# Patient Record
Sex: FEMALE | HISPANIC OR LATINO | URBAN - METROPOLITAN AREA
[De-identification: names, ages, dates, MRNs, and addresses within clinical notes are randomized per-mention and may not be internally consistent; named-entity substitution may affect disease eponyms.]

---

## 2017-12-26 ENCOUNTER — INPATIENT (INPATIENT)
Facility: HOSPITAL | Age: 52
LOS: 2 days | Discharge: HOME | DRG: 418 | End: 2017-12-29
Attending: SURGERY | Admitting: SURGERY
Payer: COMMERCIAL

## 2017-12-26 VITALS — WEIGHT: 156.97 LBS

## 2017-12-26 DIAGNOSIS — Z29.9 ENCOUNTER FOR PROPHYLACTIC MEASURES, UNSPECIFIED: ICD-10-CM

## 2017-12-26 DIAGNOSIS — Z01.818 ENCOUNTER FOR OTHER PREPROCEDURAL EXAMINATION: ICD-10-CM

## 2017-12-26 DIAGNOSIS — K81.0 ACUTE CHOLECYSTITIS: ICD-10-CM

## 2017-12-26 LAB
ALBUMIN SERPL ELPH-MCNC: 2.6 G/DL — LOW (ref 3.5–5)
ALP SERPL-CCNC: 171 U/L — HIGH (ref 40–120)
ALT FLD-CCNC: 23 U/L DA — SIGNIFICANT CHANGE UP (ref 10–60)
ANION GAP SERPL CALC-SCNC: 10 MMOL/L — SIGNIFICANT CHANGE UP (ref 5–17)
AST SERPL-CCNC: 24 U/L — SIGNIFICANT CHANGE UP (ref 10–40)
BASOPHILS # BLD AUTO: 0 K/UL — SIGNIFICANT CHANGE UP (ref 0–0.2)
BASOPHILS NFR BLD AUTO: 0.3 % — SIGNIFICANT CHANGE UP (ref 0–2)
BILIRUB SERPL-MCNC: 0.6 MG/DL — SIGNIFICANT CHANGE UP (ref 0.2–1.2)
BUN SERPL-MCNC: 20 MG/DL — HIGH (ref 7–18)
CALCIUM SERPL-MCNC: 8.9 MG/DL — SIGNIFICANT CHANGE UP (ref 8.4–10.5)
CHLORIDE SERPL-SCNC: 101 MMOL/L — SIGNIFICANT CHANGE UP (ref 96–108)
CO2 SERPL-SCNC: 25 MMOL/L — SIGNIFICANT CHANGE UP (ref 22–31)
CREAT SERPL-MCNC: 0.91 MG/DL — SIGNIFICANT CHANGE UP (ref 0.5–1.3)
EOSINOPHIL # BLD AUTO: 0 K/UL — SIGNIFICANT CHANGE UP (ref 0–0.5)
EOSINOPHIL NFR BLD AUTO: 0 % — SIGNIFICANT CHANGE UP (ref 0–6)
GLUCOSE SERPL-MCNC: 214 MG/DL — HIGH (ref 70–99)
HCT VFR BLD CALC: 36 % — SIGNIFICANT CHANGE UP (ref 34.5–45)
HGB BLD-MCNC: 11 G/DL — LOW (ref 11.5–15.5)
LACTATE SERPL-SCNC: 1.2 MMOL/L — SIGNIFICANT CHANGE UP (ref 0.7–2)
LACTATE SERPL-SCNC: 2.7 MMOL/L — HIGH (ref 0.7–2)
LYMPHOCYTES # BLD AUTO: 1.3 K/UL — SIGNIFICANT CHANGE UP (ref 1–3.3)
LYMPHOCYTES # BLD AUTO: 9.5 % — LOW (ref 13–44)
MCHC RBC-ENTMCNC: 24.1 PG — LOW (ref 27–34)
MCHC RBC-ENTMCNC: 30.5 GM/DL — LOW (ref 32–36)
MCV RBC AUTO: 79.1 FL — LOW (ref 80–100)
MONOCYTES # BLD AUTO: 0.7 K/UL — SIGNIFICANT CHANGE UP (ref 0–0.9)
MONOCYTES NFR BLD AUTO: 4.8 % — SIGNIFICANT CHANGE UP (ref 2–14)
NEUTROPHILS # BLD AUTO: 11.7 K/UL — HIGH (ref 1.8–7.4)
NEUTROPHILS NFR BLD AUTO: 85.4 % — HIGH (ref 43–77)
PLATELET # BLD AUTO: 248 K/UL — SIGNIFICANT CHANGE UP (ref 150–400)
POTASSIUM SERPL-MCNC: 3.1 MMOL/L — LOW (ref 3.5–5.3)
POTASSIUM SERPL-SCNC: 3.1 MMOL/L — LOW (ref 3.5–5.3)
PROT SERPL-MCNC: 7.9 G/DL — SIGNIFICANT CHANGE UP (ref 6–8.3)
RBC # BLD: 4.56 M/UL — SIGNIFICANT CHANGE UP (ref 3.8–5.2)
RBC # FLD: 16.7 % — HIGH (ref 10.3–14.5)
SODIUM SERPL-SCNC: 136 MMOL/L — SIGNIFICANT CHANGE UP (ref 135–145)
WBC # BLD: 13.7 K/UL — HIGH (ref 3.8–10.5)
WBC # FLD AUTO: 13.7 K/UL — HIGH (ref 3.8–10.5)

## 2017-12-26 PROCEDURE — 71010: CPT | Mod: 26

## 2017-12-26 PROCEDURE — 76705 ECHO EXAM OF ABDOMEN: CPT | Mod: 26

## 2017-12-26 PROCEDURE — 99222 1ST HOSP IP/OBS MODERATE 55: CPT | Mod: 57

## 2017-12-26 PROCEDURE — 99285 EMERGENCY DEPT VISIT HI MDM: CPT

## 2017-12-26 RX ORDER — CEFOTETAN DISODIUM 1 G
VIAL (EA) INJECTION
Qty: 0 | Refills: 0 | Status: DISCONTINUED | OUTPATIENT
Start: 2017-12-26 | End: 2017-12-27

## 2017-12-26 RX ORDER — HEPARIN SODIUM 5000 [USP'U]/ML
5000 INJECTION INTRAVENOUS; SUBCUTANEOUS EVERY 8 HOURS
Qty: 0 | Refills: 0 | Status: DISCONTINUED | OUTPATIENT
Start: 2017-12-26 | End: 2017-12-27

## 2017-12-26 RX ORDER — DEXTROSE MONOHYDRATE, SODIUM CHLORIDE, AND POTASSIUM CHLORIDE 50; .745; 4.5 G/1000ML; G/1000ML; G/1000ML
1000 INJECTION, SOLUTION INTRAVENOUS
Qty: 0 | Refills: 0 | Status: DISCONTINUED | OUTPATIENT
Start: 2017-12-26 | End: 2017-12-27

## 2017-12-26 RX ORDER — CEFOTETAN DISODIUM 1 G
1 VIAL (EA) INJECTION EVERY 12 HOURS
Qty: 0 | Refills: 0 | Status: DISCONTINUED | OUTPATIENT
Start: 2017-12-27 | End: 2017-12-27

## 2017-12-26 RX ORDER — SODIUM CHLORIDE 9 MG/ML
500 INJECTION INTRAMUSCULAR; INTRAVENOUS; SUBCUTANEOUS
Qty: 0 | Refills: 0 | Status: COMPLETED | OUTPATIENT
Start: 2017-12-26 | End: 2017-12-26

## 2017-12-26 RX ORDER — POTASSIUM CHLORIDE 20 MEQ
20 PACKET (EA) ORAL ONCE
Qty: 0 | Refills: 0 | Status: COMPLETED | OUTPATIENT
Start: 2017-12-26 | End: 2017-12-27

## 2017-12-26 RX ORDER — AZITHROMYCIN 500 MG/1
500 TABLET, FILM COATED ORAL ONCE
Qty: 0 | Refills: 0 | Status: COMPLETED | OUTPATIENT
Start: 2017-12-26 | End: 2017-12-26

## 2017-12-26 RX ORDER — CEFAZOLIN SODIUM 1 G
1000 VIAL (EA) INJECTION EVERY 8 HOURS
Qty: 0 | Refills: 0 | Status: DISCONTINUED | OUTPATIENT
Start: 2017-12-26 | End: 2017-12-26

## 2017-12-26 RX ORDER — SODIUM CHLORIDE 9 MG/ML
3 INJECTION INTRAMUSCULAR; INTRAVENOUS; SUBCUTANEOUS ONCE
Qty: 0 | Refills: 0 | Status: COMPLETED | OUTPATIENT
Start: 2017-12-26 | End: 2017-12-26

## 2017-12-26 RX ORDER — CEFTRIAXONE 500 MG/1
1 INJECTION, POWDER, FOR SOLUTION INTRAMUSCULAR; INTRAVENOUS ONCE
Qty: 0 | Refills: 0 | Status: COMPLETED | OUTPATIENT
Start: 2017-12-26 | End: 2017-12-26

## 2017-12-26 RX ORDER — CEFOTETAN DISODIUM 1 G
1 VIAL (EA) INJECTION ONCE
Qty: 0 | Refills: 0 | Status: COMPLETED | OUTPATIENT
Start: 2017-12-26 | End: 2017-12-26

## 2017-12-26 RX ADMIN — SODIUM CHLORIDE 2000 MILLILITER(S): 9 INJECTION INTRAMUSCULAR; INTRAVENOUS; SUBCUTANEOUS at 16:10

## 2017-12-26 RX ADMIN — AZITHROMYCIN 500 MILLIGRAM(S): 500 TABLET, FILM COATED ORAL at 16:35

## 2017-12-26 RX ADMIN — SODIUM CHLORIDE 2000 MILLILITER(S): 9 INJECTION INTRAMUSCULAR; INTRAVENOUS; SUBCUTANEOUS at 16:30

## 2017-12-26 RX ADMIN — SODIUM CHLORIDE 2000 MILLILITER(S): 9 INJECTION INTRAMUSCULAR; INTRAVENOUS; SUBCUTANEOUS at 16:45

## 2017-12-26 RX ADMIN — CEFTRIAXONE 100 GRAM(S): 500 INJECTION, POWDER, FOR SOLUTION INTRAMUSCULAR; INTRAVENOUS at 16:35

## 2017-12-26 RX ADMIN — SODIUM CHLORIDE 3 MILLILITER(S): 9 INJECTION INTRAMUSCULAR; INTRAVENOUS; SUBCUTANEOUS at 16:35

## 2017-12-26 RX ADMIN — SODIUM CHLORIDE 2000 MILLILITER(S): 9 INJECTION INTRAMUSCULAR; INTRAVENOUS; SUBCUTANEOUS at 17:00

## 2017-12-26 RX ADMIN — Medication 120 GRAM(S): at 21:34

## 2017-12-26 NOTE — CONSULT NOTE ADULT - PROBLEM SELECTOR RECOMMENDATION 2
on CXR pt with right atelectasis possible 2/2 pain   EKG showing LBBB (unknown if new or old) pt denies any cardiac history except Mother hx of CAD   denies any chest pain or ISABEL  T1 level   f/u preop Echocardiogram

## 2017-12-26 NOTE — H&P ADULT - HISTORY OF PRESENT ILLNESS
51 yo female, denies pmhx, to Er c/o 1day hx ruq/epigastric pain after eating greasy food. nausea no vomiting. no hematemesis. no cp no sob. no diarrhea although she states her stool is light colored. She denies recent weight loss.   No etoh abuse. non smoker.  no flank pain or hematuria. no diaphoresis of trivedi.  Pt admitted hx of recent cough and sniffles, but presently states feeling better.                            11.0   13.7  )-----------( 248      ( 26 Dec 2017 16:25 )             36.0   12-26    136  |  101  |  20<H>  ----------------------------<  214<H>  3.1<L>   |  25  |  0.91    Ca    8.9      26 Dec 2017 16:25    TPro  7.9  /  Alb  2.6<L>  /  TBili  0.6  /  DBili  x   /  AST  24  /  ALT  23  /  AlkPhos  171<H>  12-26      < from: US Hepatic & Pancreatic (12.26.17 @ 17:18) >  IMPRESSION:    Large nonmobile gallstone in the gallbladder with gallbladder wall   thickening. Mild CBD dilatation is also noted. Findings raise possibility   of acute versus chronic cholecystitis and consider further evaluation   with nuclear medicine study    < end of copied text >

## 2017-12-26 NOTE — CONSULT NOTE ADULT - ASSESSMENT
53 yo Slovenian speaking F from home, no PMH or PSH, presented to the ER complaining of 1day of RUQ and epigastric pain after eating greasy food, pain was described as dull, 6/10. Pain was also associated to nausea and subjective fever. Pt denies vomiting, hematemesis, cp, sob, diarrhea although she states her stool is light colored. She denies recent weight loss. No ETOH abuse, never smoker. No flank pain or hematuria. no diaphoresis or trivedi. Pt admitted hx of recent cough and sniffles, but presently states feeling better.

## 2017-12-26 NOTE — CONSULT NOTE ADULT - SUBJECTIVE AND OBJECTIVE BOX
Patient is a 52y old  Female who presents with a chief complaint of RUQ pain and fever 1 day (26 Dec 2017 19:20)    INTERVAL HPI / OVERNIGHT EVENTS:    51 yo Armenian speaking F from home, no PMH or PSH, presented to the ER complaining of 1day of RUQ and epigastric pain after eating greasy food, pain was described as dull, 6/10. Pain was also associated to nausea and subjective fever. Pt denies vomiting, hematemesis, cp, sob, diarrhea although she states her stool is light colored. She denies recent weight loss. No ETOH abuse, never smoker. No flank pain or hematuria. no diaphoresis or trivedi. Pt admitted hx of recent cough and sniffles, but presently states feeling better.    T(C): 38.3 (12-26-17 @ 19:21), Max: 38.6 (12-26-17 @ 15:21)  HR: 102 (12-26-17 @ 19:21) (102 - 133)  BP: 139/56 (12-26-17 @ 19:21) (139/56 - 143/75)  RR: 20 (12-26-17 @ 19:21) (20 - 22)  SpO2: 96% (12-26-17 @ 19:21) (93% - 96%)  I&O's Summary      LABS:                        11.0   13.7  )-----------( 248      ( 26 Dec 2017 16:25 )             36.0     12-26    136  |  101  |  20<H>  ----------------------------<  214<H>  3.1<L>   |  25  |  0.91    Ca    8.9      26 Dec 2017 16:25    TPro  7.9  /  Alb  2.6<L>  /  TBili  0.6  /  DBili  x   /  AST  24  /  ALT  23  /  AlkPhos  171<H>  12-26    REVIEW OF SYSTEMS:  CONSTITUTIONAL: Subjective fever, denies weight loss, or fatigue  EYES: No eye pain, visual disturbances, or discharge  ENMT:  No difficulty hearing, tinnitus, vertigo; No sinus or throat pain  NECK: No pain or stiffness  RESPIRATORY: No cough, wheezing, chills or hemoptysis; No shortness of breath  CARDIOVASCULAR: No chest pain, palpitations, dizziness, or leg swelling  GASTROINTESTINAL: mild epigastric and RUQ pain. Intermittent nausea, no vomiting or hematemesis; No diarrhea or constipation. No melena or hematochezia.  GENITOURINARY: No dysuria, frequency, hematuria, or incontinence  NEUROLOGICAL: No headaches, memory loss, loss of strength, numbness, or tremors  SKIN: No itching, burning, rashes, or lesions   ENDOCRINE: No heat or cold intolerance; No hair loss  MUSCULOSKELETAL: No joint pain or swelling; No muscle, back, or extremity pain    RADIOLOGY & ADDITIONAL TESTS:    CXR 12/26 showing basilar atelectases right greater than left with elevated right hemidiaphragm     Imaging Personally Reviewed:  [x] YES  [ ] NO    Consultant(s) Notes Reviewed:  [x] YES  [ ] NO    PHYSICAL EXAM:  GENERAL: NAD, well-groomed, well-developed  HEAD:  Atraumatic, Normocephalic  EYES: EOMI, PERRLA, conjunctiva and sclera clear  ENMT: No tonsillar erythema, exudates, or enlargement; Moist mucous membranes, Good dentition, No lesions  NECK: Supple, No JVD, Normal thyroid  NERVOUS SYSTEM:  Alert & Oriented X3, Good concentration; Motor Strength 5/5 B/L upper and lower extremities; DTRs 2+ intact and symmetric  CHEST/LUNG: Clear to percussion bilaterally; No rales, rhonchi, wheezing, or rubs  HEART: Regular rate and rhythm; No murmurs, rubs, or gallops  ABDOMEN: Soft, Nontender, Nondistended; Bowel sounds present  EXTREMITIES:  2+ Peripheral Pulses, No clubbing, cyanosis, or edema  SKIN: No rashes or lesions    Care Discussed with Consultants/Other Providers [x] YES  [ ] NO

## 2017-12-26 NOTE — ED PROVIDER NOTE - OBJECTIVE STATEMENT
53 y/o F pt with no PMHx and no PSHx presents to ED c/o fever, nausea, cough, and abd pain x2-3 days. Pt describes abd pain as mild, and located in the epigastric/RUQ abdominal region. Pt additionally reports URI sx's including runny nose, nasal congestion, and sore throat, as well as several episodes of diarrhea. Pt denies vomiting, or any other complaints. NKDA.

## 2017-12-26 NOTE — H&P ADULT - NSHPPHYSICALEXAM_GEN_ALL_CORE
140/50 p102 tmax 101.4  A&Ox3 nad  Abd: soft, mild ruq tenderness with dep palpation. no guarding no rebound tenderness. no masses.  Lungs: cta bilat no wrr  Ext: no cce

## 2017-12-26 NOTE — CONSULT NOTE ADULT - PROBLEM SELECTOR RECOMMENDATION 9
pt presented with RUQ pain associated to oral intake with nausea and subjective fever   on admission pt febrile to 101 with WBC 13.7 and lactate 2.7  pt was given Cefazolin and 2.5L NS bolus with improvement of lactate of 1.2   liver and gallbladder sonogram showing large nonmobile gallstone in the gallbladder with gallbladder wall thickening. Mild CBD dilatation is also noted. Findings likely 2/2 acute versus chronic cholecystitis  c/w IVF   c/w Cefotetan 1gr q/12 hrs   c/w NPO and Zofran PRN for nausea  f/u Blood Cx

## 2017-12-26 NOTE — ED PROVIDER NOTE - MEDICAL DECISION MAKING DETAILS
51 y/o F pt presents with fever, nausea, cough, and abd pain. Plan for CXR. If showing PNA, will treat for PNA. If negative, will require RUQ sonogram. Will also obtain labs. Code Sepsis called by nursing, however not clear if pt has source of infection yet.

## 2017-12-27 ENCOUNTER — RESULT REVIEW (OUTPATIENT)
Age: 52
End: 2017-12-27

## 2017-12-27 DIAGNOSIS — J98.11 ATELECTASIS: ICD-10-CM

## 2017-12-27 DIAGNOSIS — I44.7 LEFT BUNDLE-BRANCH BLOCK, UNSPECIFIED: ICD-10-CM

## 2017-12-27 DIAGNOSIS — E66.9 OBESITY, UNSPECIFIED: ICD-10-CM

## 2017-12-27 LAB
ABO RH CONFIRMATION: SIGNIFICANT CHANGE UP
ALBUMIN SERPL ELPH-MCNC: 2 G/DL — LOW (ref 3.5–5)
ALP SERPL-CCNC: 144 U/L — HIGH (ref 40–120)
ALT FLD-CCNC: 25 U/L DA — SIGNIFICANT CHANGE UP (ref 10–60)
ANION GAP SERPL CALC-SCNC: 6 MMOL/L — SIGNIFICANT CHANGE UP (ref 5–17)
APPEARANCE UR: CLEAR — SIGNIFICANT CHANGE UP
APTT BLD: 28.6 SEC — SIGNIFICANT CHANGE UP (ref 27.5–37.4)
AST SERPL-CCNC: 25 U/L — SIGNIFICANT CHANGE UP (ref 10–40)
BILIRUB SERPL-MCNC: 0.6 MG/DL — SIGNIFICANT CHANGE UP (ref 0.2–1.2)
BILIRUB UR-MCNC: NEGATIVE — SIGNIFICANT CHANGE UP
BUN SERPL-MCNC: 11 MG/DL — SIGNIFICANT CHANGE UP (ref 7–18)
CALCIUM SERPL-MCNC: 7.8 MG/DL — LOW (ref 8.4–10.5)
CHLORIDE SERPL-SCNC: 109 MMOL/L — HIGH (ref 96–108)
CHOLEST SERPL-MCNC: 91 MG/DL — SIGNIFICANT CHANGE UP (ref 10–199)
CK MB BLD-MCNC: <2.3 % — SIGNIFICANT CHANGE UP (ref 0–3.5)
CK MB BLD-MCNC: <2.5 % — SIGNIFICANT CHANGE UP (ref 0–3.5)
CK MB CFR SERPL CALC: <1 NG/ML — SIGNIFICANT CHANGE UP (ref 0–3.6)
CK MB CFR SERPL CALC: <1 NG/ML — SIGNIFICANT CHANGE UP (ref 0–3.6)
CK SERPL-CCNC: 40 U/L — SIGNIFICANT CHANGE UP (ref 21–215)
CK SERPL-CCNC: 43 U/L — SIGNIFICANT CHANGE UP (ref 21–215)
CO2 SERPL-SCNC: 25 MMOL/L — SIGNIFICANT CHANGE UP (ref 22–31)
COLOR SPEC: YELLOW — SIGNIFICANT CHANGE UP
CREAT SERPL-MCNC: 0.55 MG/DL — SIGNIFICANT CHANGE UP (ref 0.5–1.3)
DIFF PNL FLD: ABNORMAL
GLUCOSE SERPL-MCNC: 94 MG/DL — SIGNIFICANT CHANGE UP (ref 70–99)
GLUCOSE UR QL: NEGATIVE — SIGNIFICANT CHANGE UP
HBA1C BLD-MCNC: 5.8 % — HIGH (ref 4–5.6)
HCG SERPL-ACNC: 1 MIU/ML — SIGNIFICANT CHANGE UP
HCT VFR BLD CALC: 32.8 % — LOW (ref 34.5–45)
HDLC SERPL-MCNC: 21 MG/DL — LOW (ref 40–125)
HGB BLD-MCNC: 9.7 G/DL — LOW (ref 11.5–15.5)
INR BLD: 1.12 RATIO — SIGNIFICANT CHANGE UP (ref 0.88–1.16)
KETONES UR-MCNC: NEGATIVE — SIGNIFICANT CHANGE UP
LEUKOCYTE ESTERASE UR-ACNC: ABNORMAL
LIPID PNL WITH DIRECT LDL SERPL: 53 MG/DL — SIGNIFICANT CHANGE UP
MCHC RBC-ENTMCNC: 23.1 PG — LOW (ref 27–34)
MCHC RBC-ENTMCNC: 29.7 GM/DL — LOW (ref 32–36)
MCV RBC AUTO: 77.8 FL — LOW (ref 80–100)
NITRITE UR-MCNC: NEGATIVE — SIGNIFICANT CHANGE UP
PH UR: 6 — SIGNIFICANT CHANGE UP (ref 5–8)
PLATELET # BLD AUTO: 197 K/UL — SIGNIFICANT CHANGE UP (ref 150–400)
POTASSIUM SERPL-MCNC: 3.7 MMOL/L — SIGNIFICANT CHANGE UP (ref 3.5–5.3)
POTASSIUM SERPL-SCNC: 3.7 MMOL/L — SIGNIFICANT CHANGE UP (ref 3.5–5.3)
PROT SERPL-MCNC: 6.6 G/DL — SIGNIFICANT CHANGE UP (ref 6–8.3)
PROT UR-MCNC: 30 MG/DL
PROTHROM AB SERPL-ACNC: 12.2 SEC — SIGNIFICANT CHANGE UP (ref 9.8–12.7)
RBC # BLD: 4.22 M/UL — SIGNIFICANT CHANGE UP (ref 3.8–5.2)
RBC # FLD: 16.3 % — HIGH (ref 10.3–14.5)
SODIUM SERPL-SCNC: 140 MMOL/L — SIGNIFICANT CHANGE UP (ref 135–145)
SP GR SPEC: 1.01 — SIGNIFICANT CHANGE UP (ref 1.01–1.02)
TOTAL CHOLESTEROL/HDL RATIO MEASUREMENT: 4.3 RATIO — SIGNIFICANT CHANGE UP (ref 3.3–7.1)
TRIGL SERPL-MCNC: 85 MG/DL — SIGNIFICANT CHANGE UP (ref 10–149)
TROPONIN I SERPL-MCNC: <0.015 NG/ML — SIGNIFICANT CHANGE UP (ref 0–0.04)
TROPONIN I SERPL-MCNC: <0.015 NG/ML — SIGNIFICANT CHANGE UP (ref 0–0.04)
UROBILINOGEN FLD QL: 4
WBC # BLD: 8 K/UL — SIGNIFICANT CHANGE UP (ref 3.8–10.5)
WBC # FLD AUTO: 8 K/UL — SIGNIFICANT CHANGE UP (ref 3.8–10.5)

## 2017-12-27 PROCEDURE — 47563 LAPARO CHOLECYSTECTOMY/GRAPH: CPT | Mod: AS

## 2017-12-27 PROCEDURE — 47563 LAPARO CHOLECYSTECTOMY/GRAPH: CPT

## 2017-12-27 PROCEDURE — 88304 TISSUE EXAM BY PATHOLOGIST: CPT | Mod: 26

## 2017-12-27 RX ORDER — HEPARIN SODIUM 5000 [USP'U]/ML
5000 INJECTION INTRAVENOUS; SUBCUTANEOUS EVERY 8 HOURS
Qty: 0 | Refills: 0 | Status: DISCONTINUED | OUTPATIENT
Start: 2017-12-27 | End: 2017-12-29

## 2017-12-27 RX ORDER — OXYCODONE AND ACETAMINOPHEN 5; 325 MG/1; MG/1
1 TABLET ORAL EVERY 4 HOURS
Qty: 0 | Refills: 0 | Status: DISCONTINUED | OUTPATIENT
Start: 2017-12-27 | End: 2017-12-29

## 2017-12-27 RX ORDER — CEFOTETAN DISODIUM 1 G
1 VIAL (EA) INJECTION EVERY 12 HOURS
Qty: 0 | Refills: 0 | Status: DISCONTINUED | OUTPATIENT
Start: 2017-12-28 | End: 2017-12-29

## 2017-12-27 RX ORDER — CEFOTETAN DISODIUM 1 G
VIAL (EA) INJECTION
Qty: 0 | Refills: 0 | Status: DISCONTINUED | OUTPATIENT
Start: 2017-12-27 | End: 2017-12-29

## 2017-12-27 RX ORDER — FENTANYL CITRATE 50 UG/ML
25 INJECTION INTRAVENOUS
Qty: 0 | Refills: 0 | Status: DISCONTINUED | OUTPATIENT
Start: 2017-12-27 | End: 2017-12-27

## 2017-12-27 RX ORDER — SODIUM CHLORIDE 9 MG/ML
1000 INJECTION, SOLUTION INTRAVENOUS
Qty: 0 | Refills: 0 | Status: DISCONTINUED | OUTPATIENT
Start: 2017-12-27 | End: 2017-12-27

## 2017-12-27 RX ORDER — HYDROMORPHONE HYDROCHLORIDE 2 MG/ML
0.5 INJECTION INTRAMUSCULAR; INTRAVENOUS; SUBCUTANEOUS
Qty: 0 | Refills: 0 | Status: DISCONTINUED | OUTPATIENT
Start: 2017-12-27 | End: 2017-12-27

## 2017-12-27 RX ORDER — CEFOTETAN DISODIUM 1 G
1 VIAL (EA) INJECTION ONCE
Qty: 0 | Refills: 0 | Status: COMPLETED | OUTPATIENT
Start: 2017-12-27 | End: 2017-12-27

## 2017-12-27 RX ADMIN — SODIUM CHLORIDE 125 MILLILITER(S): 9 INJECTION, SOLUTION INTRAVENOUS at 19:34

## 2017-12-27 RX ADMIN — DEXTROSE MONOHYDRATE, SODIUM CHLORIDE, AND POTASSIUM CHLORIDE 125 MILLILITER(S): 50; .745; 4.5 INJECTION, SOLUTION INTRAVENOUS at 01:53

## 2017-12-27 RX ADMIN — Medication 120 GRAM(S): at 09:24

## 2017-12-27 RX ADMIN — Medication 20 MILLIEQUIVALENT(S): at 01:53

## 2017-12-27 RX ADMIN — Medication 120 GRAM(S): at 19:34

## 2017-12-27 RX ADMIN — HEPARIN SODIUM 5000 UNIT(S): 5000 INJECTION INTRAVENOUS; SUBCUTANEOUS at 05:43

## 2017-12-27 NOTE — PROGRESS NOTE ADULT - SUBJECTIVE AND OBJECTIVE BOX
Post Op    Pt feeling well, c/o minimal pain; denies N/V, fever,chills    PE: comfortable, NAD  Vital Signs Last 24 Hrs  T(C): 37.2 (27 Dec 2017 20:57), Max: 38.1 (26 Dec 2017 21:38)  T(F): 98.9 (27 Dec 2017 20:57), Max: 100.6 (26 Dec 2017 21:38)  HR: 104 (27 Dec 2017 20:57) (70 - 104)  BP: 157/92 (27 Dec 2017 20:57) (104/57 - 157/92)  BP(mean): 82 (27 Dec 2017 18:44) (70 - 82)  RR: 18 (27 Dec 2017 20:57) (13 - 21)  SpO2: 99% (27 Dec 2017 20:57) (91% - 100%)    Chest: B/L BS, decreased at bases  CVS: S1S2  Abdomen: soft, ND, +BS; dressing C/D/I; JORGE with ss draiange  Ext: no calf tenderness or rigidity    I&O's Detail    27 Dec 2017 07:01  -  27 Dec 2017 21:21  --------------------------------------------------------  IN:    Lactated Ringers IV Bolus: 2000 mL    lactated ringers.: 125 mL  Total IN: 2125 mL    OUT:    Bulb: 40 mL    Estimated Blood Loss: 50 mL  Total OUT: 90 mL    Total NET: 2035 mL      MEDICATIONS  (STANDING):  cefoTEtan  IVPB      heparin  Injectable 5000 Unit(s) SubCutaneous every 8 hours    MEDICATIONS  (PRN):  oxyCODONE    5 mG/acetaminophen 325 mG 1 Tablet(s) Oral every 4 hours PRN Moderate Pain (4 - 6)

## 2017-12-27 NOTE — PROGRESS NOTE ADULT - ASSESSMENT
52F with acute cholecystitis. For OR today. New LBBB on EKG--echo to be obtained. Risks, benefits alternatives discussed. All questions answered. Agrees to proceed.    1) f/u echo and cardiology eval  2) OR today

## 2017-12-27 NOTE — PROGRESS NOTE ADULT - SUBJECTIVE AND OBJECTIVE BOX
Patient seen and examined at bedside  Immediate family members at bedside  All questions and concerns appropriately answered and addressed    Pt endorses intermittent abdominal pain, nausea, and generalized warmth, otherwise no other additional acute events noted overnight  Case discussed with medical team    53 yo female, from home, with no significant PMHx, who p/w fevers, nausea, and RUQ/Epigastric pain.  Pt admitted for acute cholecystitis.   Upon admission, CXR (+) basilar atelectasis and EKG (+) LBBB  No personal history of EKG abnormalities    No alcohol or IVDA  Non smoker    No Known Allergies     MEDICATIONS  (STANDING):  cefoTEtan  IVPB      cefoTEtan  IVPB 1 Gram(s) IV Intermittent every 12 hours  heparin  Injectable 5000 Unit(s) SubCutaneous every 8 hours  sodium chloride 0.9% Bolus 500 milliLiter(s) IV Bolus every 15 minutes  sodium chloride 0.9% with potassium chloride 20 mEq/L 1000 milliLiter(s) (125 mL/Hr) IV Continuous <Continuous>    MEDICATIONS  (PRN):    REVIEW OF SYSTEMS:  CONSTITUTIONAL: (+) malaise  EYES: No acute change in vision or eye pain  ENT:  No difficulty hearing, tinnitus, vertigo  NECK: No pain or stiffness  RESPIRATORY: No cough, wheezing, hemoptysis, or shortness of breath  CARDIOVASCULAR: No chest pain, palpitations, syncope, dizziness, or pedal edema  GASTROINTESTINAL:(+) abdominal pain. No vomiting, hematemesis, diarrhea, melena, or hematochezia.  GENITOURINARY: No dysuria, hematuria  NEUROLOGICAL: No headaches, memory loss, no acute loss of strength or change in sensation  LYMPH Nodes: No enlarged glands  ENDOCRINE: No heat or cold intolerance; No hair loss  HEME/LYMPH: No easy bruising or bleeding  ALLERGY AND IMMUNOLOGIC: No hives or eczema	    T(C): 37.2 (17 @ 10:01), Max: 38.6 (17 @ 15:21)  HR: 92 (17 @ 10:01) (81 - 133)  BP: 131/61 (17 @ 10:01) (110/47 - 143/75)  RR: 17 (17 @ 10:01) (17 - 22)  SpO2: 96% (17 @ 10:01) (93% - 100%)    PHYSICAL EXAMINATION:   Constitutional: WD, WN, NAD  HEENT: NC, AT  Neck:  Supple. No JVD, bruits or thyromegaly  Respiratory:  Bibasilar inspiratory dry rales.  No wheezing/rhonchi.   Adequate airflow b/l. Not using accessory muscles of respiration.  Cardiovascular:  RRR, no M/R/G, 2+ pulses b/l  Gastrointestinal: Soft, NT, ND, normoactive b.s., no organomegaly/RT/rigidity  Extremities: WWP.  No c/c/e  Neurological:  Alert and awake.  No acute focal motor deficits. Crude sensation intact.     Labs and imaging reviewed  LABS:                        9.7    8.0   )-----------( 197      ( 27 Dec 2017 06:26 )             32.8     12    140  |  109<H>  |  11  ----------------------------<  94  3.7   |  25  |  0.55    Ca    7.8<L>      27 Dec 2017 06:26    TPro  6.6  /  Alb  2.0<L>  /  TBili  0.6  /  DBili  x   /  AST  25  /  ALT  25  /  AlkPhos  144<H>  -27    CARDIAC MARKERS ( 27 Dec 2017 06:26 )  <0.015 ng/mL / x     / 43 U/L / x     / <1.0 ng/mL  CARDIAC MARKERS ( 27 Dec 2017 02:13 )  <0.015 ng/mL / x     / 40 U/L / x     / <1.0 ng/mL  CARDIAC MARKERS ( 26 Dec 2017 20:09 )  <0.015 ng/mL / x     / 40 U/L / x     / <1.0 ng/mL    Urinalysis Basic - ( 27 Dec 2017 02:15 )  Color: Yellow / Appearance: Clear / S.010 / pH: x  Gluc: x / Ketone: Negative  / Bili: Negative / Urobili: 4   Blood: x / Protein: 30 mg/dL / Nitrite: Negative   Leuk Esterase: Trace / RBC: 2-5 /HPF / WBC 6-10 /HPF   Sq Epi: x / Non Sq Epi: Few /HPF / Bacteria: Moderate /HPF      LIVER FUNCTIONS - ( 27 Dec 2017 06:26 )  Alb: 2.0 g/dL / Pro: 6.6 g/dL / ALK PHOS: 144 U/L / ALT: 25 U/L DA / AST: 25 U/L / GGT: x                 < from: US Hepatic & Pancreatic (12..17 @ 17:18) >  IMPRESSION:  Large nonmobile gallstone in the gallbladder with gallbladder wall   thickening. Mild CBD dilatation is also noted. Findings raise possibility   of acute versus chronic cholecystitis and consider further evaluation   with nuclear medicine study

## 2017-12-27 NOTE — PROGRESS NOTE ADULT - ASSESSMENT
S/P lap cholecystectomy    Hemodynamically stable    -OOB, IS  -CLD  -Cont abx  -Pain management  -GI/DVT prophylaxis

## 2017-12-27 NOTE — PROGRESS NOTE ADULT - PROBLEM SELECTOR PLAN 1
NPO, IVF, Cefotetan IVPB  Analgesics prn  Zofran prn  Care as per surgical team  Incentive spirometry

## 2017-12-27 NOTE — CONSULT NOTE ADULT - SUBJECTIVE AND OBJECTIVE BOX
HISTORY OF PRESENT ILLNESS: HPI:  51 yo female, denies pmhx but admits to poor medical follow up, presents  to Er c/o 1day hx ruq/epigastric pain after eating greasy food. nausea no vomiting. no hematemesis. no cp no sob. no diarrhea although she states her stool is light colored. She denies recent weight loss.   No etoh abuse. non smoker.  no flank pain or hematuria. no diaphoresis of trivedi.  Pt admitted hx of recent cough and sniffles, but presently states feeling better.  She denies CP, SOB or LOC, Incidentally found with LBBB of unknown chronicity                            11.0   13.7  )-----------( 248      ( 26 Dec 2017 16:25 )             36.0   12-26    136  |  101  |  20<H>  ----------------------------<  214<H>  3.1<L>   |  25  |  0.91    Ca    8.9      26 Dec 2017 16:25    TPro  7.9  /  Alb  2.6<L>  /  TBili  0.6  /  DBili  x   /  AST  24  /  ALT  23  /  AlkPhos  171<H>  12-26      < from: US Hepatic & Pancreatic (12.26.17 @ 17:18) >  IMPRESSION:    Large nonmobile gallstone in the gallbladder with gallbladder wall   thickening. Mild CBD dilatation is also noted. Findings raise possibility   of acute versus chronic cholecystitis and consider further evaluation   with nuclear medicine study    < end of copied text > (26 Dec 2017 19:20)      PAST MEDICAL & SURGICAL HISTORY:  No pertinent past medical history  No significant past surgical history          MEDICATIONS:  MEDICATIONS  (STANDING):  cefoTEtan  IVPB      cefoTEtan  IVPB 1 Gram(s) IV Intermittent every 12 hours  heparin  Injectable 5000 Unit(s) SubCutaneous every 8 hours  sodium chloride 0.9% Bolus 500 milliLiter(s) IV Bolus every 15 minutes  sodium chloride 0.9% with potassium chloride 20 mEq/L 1000 milliLiter(s) (125 mL/Hr) IV Continuous <Continuous>      Allergies    No Known Allergies    Intolerances        FAMILY HISTORY:  No pertinent family history in first degree relatives    Non-contributary for premature coronary disease or sudden cardiac death    SOCIAL HISTORY:    [X ] Non-smoker  [ ] Smoker  [ ] Alcohol      REVIEW OF SYSTEMS:  [ ]chest pain  [  ]shortness of breath  [  ]palpitations  [  ]syncope  [ ]near syncope [ ]upper extremity weakness   [ ] lower extremity weakness  [  ]diplopia  [  ]altered mental status   [  ]fevers  [ ]chills [ ]nausea  [ ]vomitting  [  ]dysphagia    [ ]abdominal pain  [ ]melena  [ ]BRBPR    [  ]epistaxis  [  ]rash    [ ]lower extremity edema        [X ] All others negative	  [ ] Unable to obtain    PHYSICAL EXAM:  T(C): 37.2 (12-27-17 @ 10:01), Max: 38.6 (12-26-17 @ 15:21)  HR: 92 (12-27-17 @ 10:01) (81 - 133)  BP: 131/61 (12-27-17 @ 10:01) (110/47 - 143/75)  RR: 17 (12-27-17 @ 10:01) (17 - 22)  SpO2: 96% (12-27-17 @ 10:01) (93% - 100%)  Wt(kg): --  I&O's Summary        HEENT:   Normal oral mucosa, PERRL, EOMI	  Lymphatic: No obvious lymphadenopathy , no edema  Cardiovascular: Normal S1 S2, No JVD,  1/6 SUE murmur , Peripheral pulses palpable 2+ bilaterally  Respiratory: Lungs clear to auscultation, normal effort 	  Gastrointestinal:  Soft, Non-tender, + BS	  Skin: No rashes, No cyanosis, warm to touch  Musculoskeletal: Normal range of motion, normal strength  Psychiatry:  Appropriate Mood & affect     ECG:  	Sinus LBBB  RADIOLOGY:     CXR: Basilar atelectases right greater than left with elevated   right hemidiaphragm.      	  LABS:	 	    CARDIAC MARKERS:  CARDIAC MARKERS ( 27 Dec 2017 06:26 )  <0.015 ng/mL / x     / 43 U/L / x     / <1.0 ng/mL  CARDIAC MARKERS ( 27 Dec 2017 02:13 )  <0.015 ng/mL / x     / 40 U/L / x     / <1.0 ng/mL  CARDIAC MARKERS ( 26 Dec 2017 20:09 )  <0.015 ng/mL / x     / 40 U/L / x     / <1.0 ng/mL                              9.7    8.0   )-----------( 197      ( 27 Dec 2017 06:26 )             32.8     Hb Trend: 9.7<--, 11.0<--    12-27    140  |  109<H>  |  11  ----------------------------<  94  3.7   |  25  |  0.55    Ca    7.8<L>      27 Dec 2017 06:26    TPro  6.6  /  Alb  2.0<L>  /  TBili  0.6  /  DBili  x   /  AST  25  /  ALT  25  /  AlkPhos  144<H>  12-27    Creatinine Trend: 0.55<--, 0.91<--      HgA1c: Hemoglobin A1C, Whole Blood: 5.8 % (12-27 @ 09:02)      ASSESSMENT/PLAN: 	52y Female preop gall baldder  surgery incidentally found with a LBBB on EKG of unknown chronicity.    - She ruled out for MI and has preserved LF function on echo  - A nuclear stress revealed normal perfusion  - She requires no further cardiac testing prior to OR, can proceed at Low cardiac risk'    I once again thank you for allowing me to participate in the care of your patient.  If you have any questions or concerns please do not hesitate to contact me.    Rashaun Reed MD, [facility], [facility]  [address] [place] [address]  PHONE:  [phone]  BEEPER : [phone]

## 2017-12-27 NOTE — PROGRESS NOTE ADULT - ASSESSMENT
51 yo female, from home, with no significant PMHx, who p/w fevers, nausea, and RUQ/Epigastric pain.  Pt admitted for acute cholecystitis. Upon admission, CXR (+) basilar atelectasis and EKG (+) LBBB

## 2017-12-27 NOTE — BRIEF OPERATIVE NOTE - PROCEDURE
<<-----Click on this checkbox to enter Procedure Cholecystectomies, laparoscopic  12/27/2017    Active  RLIND

## 2017-12-27 NOTE — PROGRESS NOTE ADULT - SUBJECTIVE AND OBJECTIVE BOX
SUBJECTIVE    Nausea: [ ] YES [X ] NO           Vomiting: [ ] YES [X ] NO  Flatus: [X ] YES [ ] NO             Voiding normally: [X ]YES [ ]NO  Pain under control: [X ] YES [ ] NO--she denies any pain at this time  NPO  Ambulated: [X ] YES [ ]NO      VITALS  Vital Signs Last 24 Hrs  T(C): 37 (27 Dec 2017 05:37), Max: 38.6 (26 Dec 2017 15:21)  T(F): 98.6 (27 Dec 2017 05:37), Max: 101.4 (26 Dec 2017 15:21)  HR: 81 (27 Dec 2017 05:37) (81 - 133)  BP: 110/47 (27 Dec 2017 05:37) (110/47 - 143/75)  BP(mean): --  RR: 18 (27 Dec 2017 05:37) (18 - 22)  SpO2: 97% (27 Dec 2017 05:37) (93% - 100%)    I&O's Summary      PHYSICAL EXAMINATION    Abdomen: soft, mildly tender to RUQ upon deep palpation;       LABS                        9.7    x     )-----------( 197      ( 27 Dec 2017 06:26 )             32.8     12-27    140  |  109<H>  |  11  ----------------------------<  94  3.7   |  25  |  0.55    Ca    7.8<L>      27 Dec 2017 06:26    TPro  6.6  /  Alb  2.0<L>  /  TBili  0.6  /  DBili  x   /  AST  25  /  ALT  25  /  AlkPhos  144<H>  12-27     LIVER FUNCTIONS - ( 27 Dec 2017 06:26 )  Alb: 2.0 g/dL / Pro: 6.6 g/dL / ALK PHOS: 144 U/L / ALT: 25 U/L DA / AST: 25 U/L / GGT: x             MEDICATIONS  MEDICATIONS  (STANDING):  cefoTEtan  IVPB      cefoTEtan  IVPB 1 Gram(s) IV Intermittent every 12 hours  heparin  Injectable 5000 Unit(s) SubCutaneous every 8 hours  sodium chloride 0.9% Bolus 500 milliLiter(s) IV Bolus every 15 minutes  sodium chloride 0.9% with potassium chloride 20 mEq/L 1000 milliLiter(s) (125 mL/Hr) IV Continuous <Continuous>    MEDICATIONS  (PRN):

## 2017-12-28 ENCOUNTER — TRANSCRIPTION ENCOUNTER (OUTPATIENT)
Age: 52
End: 2017-12-28

## 2017-12-28 DIAGNOSIS — R50.9 FEVER, UNSPECIFIED: ICD-10-CM

## 2017-12-28 LAB
ANION GAP SERPL CALC-SCNC: 6 MMOL/L — SIGNIFICANT CHANGE UP (ref 5–17)
BUN SERPL-MCNC: 5 MG/DL — LOW (ref 7–18)
CALCIUM SERPL-MCNC: 7.9 MG/DL — LOW (ref 8.4–10.5)
CHLORIDE SERPL-SCNC: 107 MMOL/L — SIGNIFICANT CHANGE UP (ref 96–108)
CO2 SERPL-SCNC: 26 MMOL/L — SIGNIFICANT CHANGE UP (ref 22–31)
CREAT SERPL-MCNC: 0.52 MG/DL — SIGNIFICANT CHANGE UP (ref 0.5–1.3)
CULTURE RESULTS: NO GROWTH — SIGNIFICANT CHANGE UP
GLUCOSE SERPL-MCNC: 107 MG/DL — HIGH (ref 70–99)
HCT VFR BLD CALC: 32 % — LOW (ref 34.5–45)
HGB BLD-MCNC: 10 G/DL — LOW (ref 11.5–15.5)
MCHC RBC-ENTMCNC: 24 PG — LOW (ref 27–34)
MCHC RBC-ENTMCNC: 31.2 GM/DL — LOW (ref 32–36)
MCV RBC AUTO: 76.7 FL — LOW (ref 80–100)
PLATELET # BLD AUTO: 205 K/UL — SIGNIFICANT CHANGE UP (ref 150–400)
POTASSIUM SERPL-MCNC: 3.5 MMOL/L — SIGNIFICANT CHANGE UP (ref 3.5–5.3)
POTASSIUM SERPL-SCNC: 3.5 MMOL/L — SIGNIFICANT CHANGE UP (ref 3.5–5.3)
RBC # BLD: 4.18 M/UL — SIGNIFICANT CHANGE UP (ref 3.8–5.2)
RBC # FLD: 16.6 % — HIGH (ref 10.3–14.5)
SODIUM SERPL-SCNC: 139 MMOL/L — SIGNIFICANT CHANGE UP (ref 135–145)
SPECIMEN SOURCE: SIGNIFICANT CHANGE UP
WBC # BLD: 6.4 K/UL — SIGNIFICANT CHANGE UP (ref 3.8–10.5)
WBC # FLD AUTO: 6.4 K/UL — SIGNIFICANT CHANGE UP (ref 3.8–10.5)

## 2017-12-28 RX ORDER — ACETAMINOPHEN 500 MG
650 TABLET ORAL EVERY 6 HOURS
Qty: 0 | Refills: 0 | Status: DISCONTINUED | OUTPATIENT
Start: 2017-12-28 | End: 2017-12-29

## 2017-12-28 RX ADMIN — Medication 650 MILLIGRAM(S): at 00:16

## 2017-12-28 RX ADMIN — Medication 120 GRAM(S): at 18:12

## 2017-12-28 RX ADMIN — Medication 120 GRAM(S): at 05:57

## 2017-12-28 RX ADMIN — HEPARIN SODIUM 5000 UNIT(S): 5000 INJECTION INTRAVENOUS; SUBCUTANEOUS at 05:58

## 2017-12-28 RX ADMIN — HEPARIN SODIUM 5000 UNIT(S): 5000 INJECTION INTRAVENOUS; SUBCUTANEOUS at 13:55

## 2017-12-28 NOTE — PROGRESS NOTE ADULT - PROBLEM SELECTOR PLAN 4
TTE preserved LVEF  Stress Test: normal perfusion  Cardio recs appreciated. No additional w/u needed at this time

## 2017-12-28 NOTE — PROGRESS NOTE ADULT - ASSESSMENT
52F s/p lap bishop IOC. Stable. Expected post-op fever overnight but defervesced now.    1) clears--> low fat  2) ambulate, DVT prophylaxis  3) JORGE teaching  4) continue abx  5) plan for D/C possibly tomorrow

## 2017-12-28 NOTE — PROGRESS NOTE ADULT - PROBLEM SELECTOR PLAN 1
POD # 1  C/w JORGE drain care and output monitoring  Cefotetan IVPB abx  Analgesics prn  Incentive spirometry  OOB -> Chair  C/w care as per surgery team

## 2017-12-28 NOTE — PROGRESS NOTE ADULT - SUBJECTIVE AND OBJECTIVE BOX
SUBJECTIVE    Nausea: [ ] YES [X ] NO           Vomiting: [ ] YES [ X] NO  Flatus: [ ] YES [X ] NO             Bowel Movement: [ ] YES [X ] NO       Voiding normally: [X ]YES [ ]NO  Pain under control: [X ] YES [ ] NO  Tolerating clears  Ambulated: [ ] YES [X ]NO  Using Incentive Spirometer: [X ] YES [ ] NO    VITALS  Vital Signs Last 24 Hrs  T(C): 36.8 (28 Dec 2017 05:36), Max: 38.4 (28 Dec 2017 00:01)  T(F): 98.3 (28 Dec 2017 05:36), Max: 101.2 (28 Dec 2017 00:01)  HR: 77 (28 Dec 2017 05:36) (70 - 104)  BP: 139/63 (28 Dec 2017 05:36) (104/57 - 157/92)  BP(mean): 82 (27 Dec 2017 18:44) (70 - 82)  RR: 17 (28 Dec 2017 05:36) (13 - 21)  SpO2: 98% (28 Dec 2017 05:36) (91% - 100%)    I&O's Summary    27 Dec 2017 07:01  -  28 Dec 2017 07:00  --------------------------------------------------------  IN: 2125 mL / OUT: 155 mL / NET: 1970 mL        PHYSICAL EXAMINATION    Abdomen: soft, NT except by incisions, ND; JORGE: serosanguinous      LABS                        10.0   6.4   )-----------( 205      ( 28 Dec 2017 06:32 )             32.0     12-28    139  |  107  |  5<L>  ----------------------------<  107<H>  3.5   |  26  |  0.52    Ca    7.9<L>      28 Dec 2017 06:32    TPro  6.6  /  Alb  2.0<L>  /  TBili  0.6  /  DBili  x   /  AST  25  /  ALT  25  /  AlkPhos  144<H>  12-27     LIVER FUNCTIONS - ( 27 Dec 2017 06:26 )  Alb: 2.0 g/dL / Pro: 6.6 g/dL / ALK PHOS: 144 U/L / ALT: 25 U/L DA / AST: 25 U/L / GGT: x             MEDICATIONS  MEDICATIONS  (STANDING):  cefoTEtan  IVPB      cefoTEtan  IVPB 1 Gram(s) IV Intermittent every 12 hours  heparin  Injectable 5000 Unit(s) SubCutaneous every 8 hours    MEDICATIONS  (PRN):  acetaminophen   Tablet 650 milliGRAM(s) Oral every 6 hours PRN For Temp greater than 38 C (100.4 F)  oxyCODONE    5 mG/acetaminophen 325 mG 1 Tablet(s) Oral every 4 hours PRN Moderate Pain (4 - 6)

## 2017-12-28 NOTE — PROGRESS NOTE ADULT - ASSESSMENT
51 yo female p/w abdominal pain, admitted for acute cholecystitis, s/p lap bishop on 12/27/17. POD # 1. Low grade post-op fever overnight, improving now, otherwise hemodynamically stable

## 2017-12-28 NOTE — PROGRESS NOTE ADULT - SUBJECTIVE AND OBJECTIVE BOX
Patient seen and examined at bedside    51 yo female p/w abdominal pain, admitted for acute cholecystitis, s/p lap bishop on 17  POD # 1    Low grade post-op fever overnight, improving now, otherwise hemodynamically stable  Endorses noncompliance with incentive spirometry  Denies abdominal pain, N/V, chills, cough, dysuria, hematuria, chest pain, or diarrhea, or any other acute events    PAST MEDICAL & SURGICAL HISTORY:  No pertinent past medical history  No significant past surgical history    No Known Allergies    MEDICATIONS  (STANDING):  cefoTEtan  IVPB      cefoTEtan  IVPB 1 Gram(s) IV Intermittent every 12 hours  heparin  Injectable 5000 Unit(s) SubCutaneous every 8 hours    MEDICATIONS  (PRN):  acetaminophen   Tablet 650 milliGRAM(s) Oral every 6 hours PRN For Temp greater than 38 C (100.4 F)  oxyCODONE    5 mG/acetaminophen 325 mG 1 Tablet(s) Oral every 4 hours PRN Moderate Pain (4 - 6)    REVIEW OF SYSTEMS:  CONSTITUTIONAL: No chills    EYES: No acute change in vision or eye pain  ENT:  No difficulty hearing, tinnitus, vertigo  NECK: No pain or stiffness  RESPIRATORY: No cough, wheezing, hemoptysis, or shortness of breath  CARDIOVASCULAR: No chest pain, palpitations, syncope, dizziness, or pedal edema  GASTROINTESTINAL: No abdominal pain. No vomiting, hematemesis, diarrhea, melena, or hematochezia.  GENITOURINARY: No dysuria, hematuria  NEUROLOGICAL: No headaches, memory loss, no acute loss of strength or change in sensation  LYMPH Nodes: No enlarged glands  ENDOCRINE: No heat or cold intolerance; No hair loss  HEME/LYMPH: No easy bruising or bleeding  ALLERGY AND IMMUNOLOGIC: No hives or eczema	    T(C): 36.8 (17 @ 05:36), Max: 38.4 (17 @ 00:01)  HR: 77 (17 @ 05:36) (70 - 104)  BP: 139/63 (17 @ 05:36) (104/57 - 157/92)  RR: 17 (17 @ 05:36) (13 - 21)  SpO2: 98% (17 @ 05:36) (91% - 100%)    PHYSICAL EXAMINATION:   Constitutional: WD, WN, NAD  HEENT: NC, AT  Neck:  Supple. No JVD, bruits or thyromegaly  Respiratory:  Mild bibasilar dry rales. No rhonchi/wheezing.  Adequate airflow b/l. Not using accessory muscles of respiration.  Cardiovascular:  RRR, no M/R/G, 2+ pulses b/l  Gastrointestinal: (+) JORGE drain intact.  Surgical incision sites w/ overlying steri strips intact, clean, and NT.  Abdomen is soft, NT, ND, normoactive b.s., no organomegaly/RT/rigidity  Extremities: WWP.  No c/c/e  Neurological:  Alert and awake.  No acute focal motor deficits. Crude sensation intact.     Labs and imaging reviewed    LABS:                        10.0   6.4   )-----------( 205      ( 28 Dec 2017 06:32 )             32.0     12-    139  |  107  |  5<L>  ----------------------------<  107<H>  3.5   |  26  |  0.52    Ca    7.9<L>      28 Dec 2017 06:32    TPro  6.6  /  Alb  2.0<L>  /  TBili  0.6  /  DBili  x   /  AST  25  /  ALT  25  /  AlkPhos  144<H>  1227    CARDIAC MARKERS ( 27 Dec 2017 06:26 )  <0.015 ng/mL / x     / 43 U/L / x     / <1.0 ng/mL  CARDIAC MARKERS ( 27 Dec 2017 02:13 )  <0.015 ng/mL / x     / 40 U/L / x     / <1.0 ng/mL  CARDIAC MARKERS ( 26 Dec 2017 20:09 )  <0.015 ng/mL / x     / 40 U/L / x     / <1.0 ng/mL    PT/INR - ( 27 Dec 2017 13:44 )   PT: 12.2 sec;   INR: 1.12 ratio    PTT - ( 27 Dec 2017 13:44 )  PTT:28.6 sec  Urinalysis Basic - ( 27 Dec 2017 02:15 )  Color: Yellow / Appearance: Clear / S.010 / pH: x  Gluc: x / Ketone: Negative  / Bili: Negative / Urobili: 4   Blood: x / Protein: 30 mg/dL / Nitrite: Negative   Leuk Esterase: Trace / RBC: 2-5 /HPF / WBC 6-10 /HPF   Sq Epi: x / Non Sq Epi: Few /HPF / Bacteria: Moderate /HPF      CAPILLARY BLOOD GLUCOSE  POCT Blood Glucose.: 92 mg/dL (27 Dec 2017 13:42)    LIVER FUNCTIONS - ( 27 Dec 2017 06:26 )  Alb: 2.0 g/dL / Pro: 6.6 g/dL / ALK PHOS: 144 U/L / ALT: 25 U/L DA / AST: 25 U/L / GGT: x             < from: Xray Chest 1 View AP/PA (17 @ 17:45) >    EXAM:  CHEST SINGLE AP OR PA                            PROCEDURE DATE:  2017          INTERPRETATION:  AP semisupine chest on  at 5:37 PM. Patient   has abdominal pain with fever and white stool. Ultrasound of the same day   showed a nonmobile gallstone and findings suggesting acute versus chronic   cholecystitis. There was also common duct distention.    COMPARISON: None available.    Heart is normal for projection. Elevated right hemidiaphragm noted.    There is slight linear atelectasis has left base and more pronounced   areas of atelectasis at right base.    IMPRESSION: Basilar atelectases right greater than left with elevated   right hemidiaphragm.      < end of copied text >

## 2017-12-28 NOTE — PROGRESS NOTE ADULT - SUBJECTIVE AND OBJECTIVE BOX
Patient denies CP, SOB POD #1  	  MEDICATIONS:  MEDICATIONS  (STANDING):  cefoTEtan  IVPB      cefoTEtan  IVPB 1 Gram(s) IV Intermittent every 12 hours  heparin  Injectable 5000 Unit(s) SubCutaneous every 8 hours      LABS:	 	    CARDIAC MARKERS:  CARDIAC MARKERS ( 27 Dec 2017 06:26 )  <0.015 ng/mL / x     / 43 U/L / x     / <1.0 ng/mL  CARDIAC MARKERS ( 27 Dec 2017 02:13 )  <0.015 ng/mL / x     / 40 U/L / x     / <1.0 ng/mL  CARDIAC MARKERS ( 26 Dec 2017 20:09 )  <0.015 ng/mL / x     / 40 U/L / x     / <1.0 ng/mL                                10.0   6.4   )-----------( 205      ( 28 Dec 2017 06:32 )             32.0     Hemoglobin: 10.0 g/dL (12-28 @ 06:32)  Hemoglobin: 9.7 g/dL (12-27 @ 06:26)  Hemoglobin: 11.0 g/dL (12-26 @ 16:25)      12-28    139  |  107  |  5<L>  ----------------------------<  107<H>  3.5   |  26  |  0.52    Ca    7.9<L>      28 Dec 2017 06:32    TPro  6.6  /  Alb  2.0<L>  /  TBili  0.6  /  DBili  x   /  AST  25  /  ALT  25  /  AlkPhos  144<H>  12-27    Creatinine Trend: 0.52<--, 0.55<--, 0.91<--    COAGS:       proBNP:   Lipid Profile:   HgA1c:   TSH:       PHYSICAL EXAM:  T(C): 36.8 (12-28-17 @ 05:36), Max: 38.4 (12-28-17 @ 00:01)  HR: 77 (12-28-17 @ 05:36) (70 - 104)  BP: 139/63 (12-28-17 @ 05:36) (104/57 - 157/92)  RR: 17 (12-28-17 @ 05:36) (13 - 21)  SpO2: 98% (12-28-17 @ 05:36) (91% - 100%)  Wt(kg): --  I&O's Summary    27 Dec 2017 07:01  -  28 Dec 2017 07:00  --------------------------------------------------------  IN: 2125 mL / OUT: 155 mL / NET: 1970 mL          HEENT:   Normal oral mucosa, PERRL, EOMI	  Lymphatic: No obvious lymphadenopathy , no edema  Cardiovascular: Normal S1 S2, No JVD, 1/6 SUE murmur, Peripheral pulses palpable 2+ bilaterally  Respiratory: Lungs clear to auscultation, normal effort 	  Gastrointestinal:  Soft, Non-tender, + BS	  Skin: No rashes,  No cyanosis, warm to touch  Musculoskeletal: Normal range of motion, normal strength  Psychiatry:  Appropriate Mood & affect             ASSESSMENT/PLAN: 	52y Female  incidentally found with a LBBB on EKG of unknown chronicity now s/p gallblader surgery.    - Tolerated procedure  - No clinical CHF  - HD stable  - remainder of cardiac eval can be done in our office    Rashaun Reed MD, [facility]  [facility], [facility]  [address] [place] [address]  PHONE:  [phone]  BEEPER : [phone]

## 2017-12-28 NOTE — PROGRESS NOTE ADULT - PROBLEM SELECTOR PLAN 2
Expected low grade post op fever overnight - improving, and pt is asymptomatic  C/w abx and incentive spirometry  C/w supportive care  OOB -> Chair  Monitor vitals and clinical status

## 2017-12-29 ENCOUNTER — TRANSCRIPTION ENCOUNTER (OUTPATIENT)
Age: 52
End: 2017-12-29

## 2017-12-29 VITALS
HEART RATE: 100 BPM | RESPIRATION RATE: 17 BRPM | OXYGEN SATURATION: 96 % | DIASTOLIC BLOOD PRESSURE: 63 MMHG | SYSTOLIC BLOOD PRESSURE: 141 MMHG | TEMPERATURE: 99 F

## 2017-12-29 LAB
-  AMIKACIN: SIGNIFICANT CHANGE UP
-  AMPICILLIN/SULBACTAM: SIGNIFICANT CHANGE UP
-  AMPICILLIN: SIGNIFICANT CHANGE UP
-  AZTREONAM: SIGNIFICANT CHANGE UP
-  CEFAZOLIN: SIGNIFICANT CHANGE UP
-  CEFEPIME: SIGNIFICANT CHANGE UP
-  CEFOXITIN: SIGNIFICANT CHANGE UP
-  CEFTAZIDIME: SIGNIFICANT CHANGE UP
-  CEFTRIAXONE: SIGNIFICANT CHANGE UP
-  CIPROFLOXACIN: SIGNIFICANT CHANGE UP
-  ERTAPENEM: SIGNIFICANT CHANGE UP
-  GENTAMICIN: SIGNIFICANT CHANGE UP
-  IMIPENEM: SIGNIFICANT CHANGE UP
-  LEVOFLOXACIN: SIGNIFICANT CHANGE UP
-  MEROPENEM: SIGNIFICANT CHANGE UP
-  PIPERACILLIN/TAZOBACTAM: SIGNIFICANT CHANGE UP
-  TOBRAMYCIN: SIGNIFICANT CHANGE UP
-  TRIMETHOPRIM/SULFAMETHOXAZOLE: SIGNIFICANT CHANGE UP
CULTURE RESULTS: NO GROWTH — SIGNIFICANT CHANGE UP
METHOD TYPE: SIGNIFICANT CHANGE UP
SPECIMEN SOURCE: SIGNIFICANT CHANGE UP

## 2017-12-29 PROCEDURE — 76000 FLUOROSCOPY <1 HR PHYS/QHP: CPT

## 2017-12-29 PROCEDURE — 84484 ASSAY OF TROPONIN QUANT: CPT

## 2017-12-29 PROCEDURE — 83036 HEMOGLOBIN GLYCOSYLATED A1C: CPT

## 2017-12-29 PROCEDURE — 76705 ECHO EXAM OF ABDOMEN: CPT

## 2017-12-29 PROCEDURE — 82550 ASSAY OF CK (CPK): CPT

## 2017-12-29 PROCEDURE — 87186 SC STD MICRODIL/AGAR DIL: CPT

## 2017-12-29 PROCEDURE — 85027 COMPLETE CBC AUTOMATED: CPT

## 2017-12-29 PROCEDURE — 85610 PROTHROMBIN TIME: CPT

## 2017-12-29 PROCEDURE — 99285 EMERGENCY DEPT VISIT HI MDM: CPT | Mod: 25

## 2017-12-29 PROCEDURE — 78452 HT MUSCLE IMAGE SPECT MULT: CPT

## 2017-12-29 PROCEDURE — 87086 URINE CULTURE/COLONY COUNT: CPT

## 2017-12-29 PROCEDURE — 93017 CV STRESS TEST TRACING ONLY: CPT

## 2017-12-29 PROCEDURE — 87040 BLOOD CULTURE FOR BACTERIA: CPT

## 2017-12-29 PROCEDURE — 80053 COMPREHEN METABOLIC PANEL: CPT

## 2017-12-29 PROCEDURE — 86901 BLOOD TYPING SEROLOGIC RH(D): CPT

## 2017-12-29 PROCEDURE — 86900 BLOOD TYPING SEROLOGIC ABO: CPT

## 2017-12-29 PROCEDURE — 82962 GLUCOSE BLOOD TEST: CPT

## 2017-12-29 PROCEDURE — 93005 ELECTROCARDIOGRAM TRACING: CPT

## 2017-12-29 PROCEDURE — 71045 X-RAY EXAM CHEST 1 VIEW: CPT

## 2017-12-29 PROCEDURE — 93306 TTE W/DOPPLER COMPLETE: CPT

## 2017-12-29 PROCEDURE — A9502: CPT

## 2017-12-29 PROCEDURE — 83605 ASSAY OF LACTIC ACID: CPT

## 2017-12-29 PROCEDURE — 80061 LIPID PANEL: CPT

## 2017-12-29 PROCEDURE — 87070 CULTURE OTHR SPECIMN AEROBIC: CPT

## 2017-12-29 PROCEDURE — 85730 THROMBOPLASTIN TIME PARTIAL: CPT

## 2017-12-29 PROCEDURE — 80048 BASIC METABOLIC PNL TOTAL CA: CPT

## 2017-12-29 PROCEDURE — 81001 URINALYSIS AUTO W/SCOPE: CPT

## 2017-12-29 PROCEDURE — 82553 CREATINE MB FRACTION: CPT

## 2017-12-29 PROCEDURE — 88304 TISSUE EXAM BY PATHOLOGIST: CPT

## 2017-12-29 PROCEDURE — 86850 RBC ANTIBODY SCREEN: CPT

## 2017-12-29 PROCEDURE — 84702 CHORIONIC GONADOTROPIN TEST: CPT

## 2017-12-29 RX ORDER — METRONIDAZOLE 500 MG
1 TABLET ORAL
Qty: 21 | Refills: 0 | OUTPATIENT
Start: 2017-12-29 | End: 2018-01-04

## 2017-12-29 RX ORDER — CEFUROXIME AXETIL 250 MG
2 TABLET ORAL
Qty: 28 | Refills: 0 | OUTPATIENT
Start: 2017-12-29 | End: 2018-01-04

## 2017-12-29 RX ADMIN — Medication 120 GRAM(S): at 05:49

## 2017-12-29 NOTE — PROGRESS NOTE ADULT - PROBLEM SELECTOR PLAN 1
POD # 2  Afebrile, hemodynamically stable  C/w Ceftin po bid  Analgesics prn  Tolerating diet and ambulating  Medical letter provided to the patient for her occupation   All questions and concerns appropriately addressed  Pt is medically optimized for safe d/c pending surgery approval and JORGE management

## 2017-12-29 NOTE — PROGRESS NOTE ADULT - NSHPATTENDINGPLANDISCUSS_GEN_ALL_CORE
surgical team and patient; patient agrees with plan of care
pt, pt's immediate family at bedside, surgical team, cardiologist

## 2017-12-29 NOTE — PROGRESS NOTE ADULT - SUBJECTIVE AND OBJECTIVE BOX
51 y/o female s/p laparoscopic cholecystectomy POD # 2. Patient examined at bedside, no complaints.   No nausea, no vomiting  Tolerating diet    T(F): 98.7 (12-29-17 @ 06:48), Max: 100 (12-28-17 @ 22:31)  HR: 87 (12-29-17 @ 06:48) (87 - 93)  BP: 137/76 (12-29-17 @ 06:48) (125/59 - 137/76)  RR: 18 (12-29-17 @ 06:48) (16 - 18)  SpO2: 99% (12-29-17 @ 06:48) (96% - 99%)  Wt(kg): --      12-28 @ 07:01  -  12-29 @ 07:00  --------------------------------------------------------  IN:  Total IN: 0 mL    OUT:    Bulb: 110 mL  Total OUT: 110 mL    Total NET: -110 mL      Physical Exam  General: AAOx3, No acute distress  Skin: No jaundice, no icterus  Abdomen: soft, nontender, nondistended, no rebound tenderness, no guarding, no palpable masses, JORGE in place with serous sanguinous drainage   : Normal external genitalia  Extremities: non edematous, no calf pain bilaterally

## 2017-12-29 NOTE — DISCHARGE NOTE ADULT - CARE PLAN
Principal Discharge DX:	Acute cholecystitis  Goal:	wound healing  Secondary Diagnosis:	Left bundle branch block (LBBB) Principal Discharge DX:	Acute cholecystitis  Goal:	wound healing  Instructions for follow-up, activity and diet:	Please follow up with Dr. Finley within 1 week   Diet as tolerated   You will be discharged with JORGE drains. You will need to empty them and record outputs accurately. This will be taught to you by the nursing staff. Please do not remove the JORGE drains. They will be removed in the office. Please bring to the office accurate records of output.  Secondary Diagnosis:	Left bundle branch block (LBBB)  Goal:	follow up with Dr. Reed Principal Discharge DX:	Acute cholecystitis  Goal:	wound healing  Assessment and plan of treatment:	Please follow up with Dr. Finley within 1 week   Diet as tolerated   You will be discharged with JORGE drains. You will need to empty them and record outputs accurately. This will be taught to you by the nursing staff. Please do not remove the JORGE drains. They will be removed in the office. Please bring to the office accurate records of output.  Secondary Diagnosis:	Left bundle branch block (LBBB)  Goal:	follow up with Dr. Reed

## 2017-12-29 NOTE — DISCHARGE NOTE ADULT - MEDICATION SUMMARY - MEDICATIONS TO TAKE
I will START or STAY ON the medications listed below when I get home from the hospital:    oxyCODONE-acetaminophen 5 mg-325 mg oral tablet  -- 1 tab(s) by mouth every 6 hours, As Needed -Moderate Pain (4 - 6) MDD:4 tabs  -- Indication: For Prn pain    Ceftin 250 mg oral tablet  -- 2 tab(s) by mouth every 12 hours   -- Finish all this medication unless otherwise directed by prescriber.  Medication should be taken with plenty of water.  Take with food or milk.    -- Indication: For  cholecystitis I will START or STAY ON the medications listed below when I get home from the hospital:    Flagyl 500 mg oral tablet  -- 1 tab(s) by mouth 3 times a day   -- Do not drink alcoholic beverages when taking this medication.  Finish all this medication unless otherwise directed by prescriber.  May discolor urine or feces.    -- Indication: For Acute cholecystitis    oxyCODONE-acetaminophen 5 mg-325 mg oral tablet  -- 1 tab(s) by mouth every 6 hours, As Needed -Moderate Pain (4 - 6) MDD:4 tabs  -- Indication: For Prn pain    Ceftin 250 mg oral tablet  -- 2 tab(s) by mouth every 12 hours   -- Finish all this medication unless otherwise directed by prescriber.  Medication should be taken with plenty of water.  Take with food or milk.    -- Indication: For  cholecystitis

## 2017-12-29 NOTE — PROGRESS NOTE ADULT - SUBJECTIVE AND OBJECTIVE BOX
SUBJECTIVE    Nausea: [ ] YES [X ] NO           Vomiting: [ ] YES [X ] NO  Flatus: [ X] YES [ ] NO             Bowel Movement: [X ] YES [ ] NO            Diarrhea: [ ] YES [X ] NO  Voiding normally: [X ]YES [ ]NO  Pain under control: [X ] YES [ ] NO-she denies any pain  Tolerating regular  Ambulated: [X ] YES [ ]NO  Using Incentive Spirometer: [X ] YES [ ] NO    VITALS  Vital Signs Last 24 Hrs  T(C): 37.1 (29 Dec 2017 06:48), Max: 37.8 (28 Dec 2017 22:31)  T(F): 98.7 (29 Dec 2017 06:48), Max: 100 (28 Dec 2017 22:31)  HR: 87 (29 Dec 2017 06:48) (87 - 93)  BP: 137/76 (29 Dec 2017 06:48) (125/59 - 137/76)  BP(mean): --  RR: 18 (29 Dec 2017 06:48) (16 - 18)  SpO2: 99% (29 Dec 2017 06:48) (96% - 99%)    I&O's Summary    28 Dec 2017 07:01  -  29 Dec 2017 07:00  --------------------------------------------------------  IN: 0 mL / OUT: 110 mL / NET: -110 mL        PHYSICAL EXAMINATION    Abdomen: soft, NT, ND, incisions without erythema, drainage, induration; JORGE serous      LABS                        10.0   6.4   )-----------( 205      ( 28 Dec 2017 06:32 )             32.0     12-28    139  |  107  |  5<L>  ----------------------------<  107<H>  3.5   |  26  |  0.52    Ca    7.9<L>      28 Dec 2017 06:32           MEDICATIONS  MEDICATIONS  (STANDING):  cefoTEtan  IVPB      cefoTEtan  IVPB 1 Gram(s) IV Intermittent every 12 hours  heparin  Injectable 5000 Unit(s) SubCutaneous every 8 hours    MEDICATIONS  (PRN):  acetaminophen   Tablet 650 milliGRAM(s) Oral every 6 hours PRN For Temp greater than 38 C (100.4 F)  oxyCODONE    5 mG/acetaminophen 325 mG 1 Tablet(s) Oral every 4 hours PRN Moderate Pain (4 - 6)

## 2017-12-29 NOTE — DISCHARGE NOTE ADULT - HOSPITAL COURSE
53 yo female, denies pmhx, to Er c/o 1day hx ruq/epigastric pain after eating greasy food. nausea no vomiting. no hematemesis. no cp no sob. no diarrhea although she states her stool is light colored. She denies recent weight loss.   No etoh abuse. non smoker.  no flank pain or hematuria. no diaphoresis of trivedi.  Pt admitted hx of recent cough and sniffles, but presently states feeling better.  Patient was taken to the OR and underwent laparoscopic cholecystectomy patient tolerated procedure well. patient for d/c home with JORGE drain and oral antibiotics 53 yo female, denies pmhx, to Er c/o 1day hx ruq/epigastric pain after eating greasy food. nausea no vomiting. no hematemesis. no cp no sob. no diarrhea although she states her stool is light colored. She denies recent weight loss.   No etoh abuse. non smoker.  no flank pain or hematuria. no diaphoresis of trivedi.  Pt admitted hx of recent cough and sniffles, but presently states feeling better.  Patient was taken to the OR and underwent laparoscopic cholecystectomy patient tolerated procedure well. patient for d/c home with JORGE drain and oral antibiotics.

## 2017-12-29 NOTE — DISCHARGE NOTE ADULT - CARE PROVIDER_API CALL
Elias Coe (MD), Surgery  [address] [place] [address]  Phone: [phone]  Fax: [phone]    Rashaun Reed), Cardiovascular Disease  [address] [facility] [address] [place] [address]  Phone: [phone]  Fax: [phone]

## 2017-12-29 NOTE — PROGRESS NOTE ADULT - SUBJECTIVE AND OBJECTIVE BOX
Patient denies CP, SOB POD #2    acetaminophen   Tablet 650 milliGRAM(s) Oral every 6 hours PRN  cefoTEtan  IVPB      cefoTEtan  IVPB 1 Gram(s) IV Intermittent every 12 hours  heparin  Injectable 5000 Unit(s) SubCutaneous every 8 hours  oxyCODONE    5 mG/acetaminophen 325 mG 1 Tablet(s) Oral every 4 hours PRN                            10.0   6.4   )-----------( 205      ( 28 Dec 2017 06:32 )             32.0       Hemoglobin: 10.0 g/dL (12-28 @ 06:32)  Hemoglobin: 9.7 g/dL (12-27 @ 06:26)  Hemoglobin: 11.0 g/dL (12-26 @ 16:25)      12-28    139  |  107  |  5<L>  ----------------------------<  107<H>  3.5   |  26  |  0.52    Ca    7.9<L>      28 Dec 2017 06:32      Creatinine Trend: 0.52<--, 0.55<--, 0.91<--    COAGS:     CARDIAC MARKERS ( 27 Dec 2017 06:26 )  <0.015 ng/mL / x     / 43 U/L / x     / <1.0 ng/mL  CARDIAC MARKERS ( 27 Dec 2017 02:13 )  <0.015 ng/mL / x     / 40 U/L / x     / <1.0 ng/mL  CARDIAC MARKERS ( 26 Dec 2017 20:09 )  <0.015 ng/mL / x     / 40 U/L / x     / <1.0 ng/mL        T(C): 37.1 (12-29-17 @ 06:48), Max: 37.8 (12-28-17 @ 22:31)  HR: 87 (12-29-17 @ 06:48) (87 - 93)  BP: 137/76 (12-29-17 @ 06:48) (125/59 - 137/76)  RR: 18 (12-29-17 @ 06:48) (16 - 18)  SpO2: 99% (12-29-17 @ 06:48) (96% - 99%)  Wt(kg): --    I&O's Summary    28 Dec 2017 07:01  -  29 Dec 2017 07:00  --------------------------------------------------------  IN: 0 mL / OUT: 110 mL / NET: -110 mL          HEENT:   Normal oral mucosa, PERRL, EOMI	  Lymphatic: No obvious lymphadenopathy , no edema  Cardiovascular: Normal S1 S2, No JVD, 1/6 SUE murmur, Peripheral pulses palpable 2+ bilaterally  Respiratory: Lungs clear to auscultation, normal effort 	  Gastrointestinal:  Soft, Non-tender, + BS	  Skin: No rashes,  No cyanosis, warm to touch  Musculoskeletal: Normal range of motion, normal strength  Psychiatry:  Appropriate Mood & affect             ASSESSMENT/PLAN: 	52y Female  incidentally found with a LBBB on EKG of unknown chronicity now s/p gallblader surgery.    - No clinical CHF  - HD stable  - remainder of cardiac eval can be done in our office  - D/C planning per surgery    Rashaun Reed MD, FACC  [facility], [facility]  [address] [place] [address]  PHONE:  [phone]  BEEPER : [phone]

## 2017-12-29 NOTE — PROGRESS NOTE ADULT - ASSESSMENT
53 yo female, with no pmhx who p/w abdominal pain, admitted for acute cholecystitis, today is pod # 2

## 2017-12-29 NOTE — PROGRESS NOTE ADULT - SUBJECTIVE AND OBJECTIVE BOX
Patient seen and examined at bedside  No acute events noted overnight  POD # 2 s/p lap bishop      53 yo female, with no pmhx who p/w abdominal pain, admitted for acute cholecystitis, today is pod # 2       PAST MEDICAL & SURGICAL HISTORY:  No pertinent past medical history  No significant past surgical history    No Known Allergies     MEDICATIONS  (STANDING):  cefoTEtan  IVPB      cefoTEtan  IVPB 1 Gram(s) IV Intermittent every 12 hours  heparin  Injectable 5000 Unit(s) SubCutaneous every 8 hours    MEDICATIONS  (PRN):  acetaminophen   Tablet 650 milliGRAM(s) Oral every 6 hours PRN For Temp greater than 38 C (100.4 F)  oxyCODONE    5 mG/acetaminophen 325 mG 1 Tablet(s) Oral every 4 hours PRN Moderate Pain (4 - 6)      REVIEW OF SYSTEMS:  CONSTITUTIONAL: No fevers or chills  EYES: No acute change in vision or eye pain  ENT:  No difficulty hearing, tinnitus, vertigo  NECK: No pain or stiffness  RESPIRATORY: No cough, wheezing, hemoptysis, or shortness of breath  CARDIOVASCULAR: No chest pain, palpitations, syncope, dizziness, or pedal edema  GASTROINTESTINAL: No abdominal pain. No vomiting, hematemesis, diarrhea, melena, or hematochezia.  GENITOURINARY: No dysuria, hematuria  NEUROLOGICAL: No headaches, memory loss, no acute loss of strength or change in sensation  LYMPH Nodes: No enlarged glands  ENDOCRINE: No heat or cold intolerance; No hair loss  HEME/LYMPH: No easy bruising or bleeding  ALLERGY AND IMMUNOLOGIC: No hives or eczema	    T(C): 37.1 (12-29-17 @ 06:48), Max: 37.8 (12-28-17 @ 22:31)  HR: 87 (12-29-17 @ 06:48) (87 - 93)  BP: 137/76 (12-29-17 @ 06:48) (125/59 - 137/76)  RR: 18 (12-29-17 @ 06:48) (16 - 18)  SpO2: 99% (12-29-17 @ 06:48) (96% - 99%)    PHYSICAL EXAMINATION:   Constitutional: WD, WN, NAD  HEENT: NC, AT  Neck:  Supple. No JVD, bruits or thyromegaly  Respiratory:  CTA b/l.  Adequate airflow b/l. Not using accessory muscles of respiration.  Cardiovascular:  RRR, no M/R/G, 2+ pulses b/l  Gastrointestinal: JORGE intact with ~ 5 cc output, steristrips intact and clean, soft, NT, ND, normoactive b.s., no organomegaly/RT/rigidity  Extremities: WWP.  No c/c/e  Neurological:  Alert and awake.  No acute focal motor deficits. Crude sensation intact.     Labs and imaging reviewed    LABS:                        10.0   6.4   )-----------( 205      ( 28 Dec 2017 06:32 )             32.0     12-28    139  |  107  |  5<L>  ----------------------------<  107<H>  3.5   |  26  |  0.52    Ca    7.9<L>      28 Dec 2017 06:32  PT/INR - ( 27 Dec 2017 13:44 )   PT: 12.2 sec;   INR: 1.12 ratio    PTT - ( 27 Dec 2017 13:44 )  PTT:28.6 sec

## 2017-12-29 NOTE — DISCHARGE NOTE ADULT - PLAN OF CARE
wound healing Please follow up with Dr. Finley within 1 week   Diet as tolerated   You will be discharged with JORGE drains. You will need to empty them and record outputs accurately. This will be taught to you by the nursing staff. Please do not remove the JORGE drains. They will be removed in the office. Please bring to the office accurate records of output. follow up with Dr. Reed

## 2017-12-31 LAB
CULTURE RESULTS: SIGNIFICANT CHANGE UP
CULTURE RESULTS: SIGNIFICANT CHANGE UP
SPECIMEN SOURCE: SIGNIFICANT CHANGE UP
SPECIMEN SOURCE: SIGNIFICANT CHANGE UP

## 2018-01-01 LAB
CULTURE RESULTS: SIGNIFICANT CHANGE UP
ORGANISM # SPEC MICROSCOPIC CNT: SIGNIFICANT CHANGE UP
ORGANISM # SPEC MICROSCOPIC CNT: SIGNIFICANT CHANGE UP
SPECIMEN SOURCE: SIGNIFICANT CHANGE UP

## 2018-01-02 LAB
CULTURE RESULTS: SIGNIFICANT CHANGE UP
SPECIMEN SOURCE: SIGNIFICANT CHANGE UP

## 2018-01-03 PROBLEM — Z00.00 ENCOUNTER FOR PREVENTIVE HEALTH EXAMINATION: Status: ACTIVE | Noted: 2018-01-03

## 2018-01-04 ENCOUNTER — APPOINTMENT (OUTPATIENT)
Dept: SURGERY | Facility: CLINIC | Age: 53
End: 2018-01-04
Payer: COMMERCIAL

## 2018-01-04 VITALS
HEIGHT: 61 IN | HEART RATE: 106 BPM | WEIGHT: 159 LBS | OXYGEN SATURATION: 97 % | SYSTOLIC BLOOD PRESSURE: 153 MMHG | BODY MASS INDEX: 30.02 KG/M2 | TEMPERATURE: 98.2 F | DIASTOLIC BLOOD PRESSURE: 87 MMHG

## 2018-01-04 DIAGNOSIS — Z80.1 FAMILY HISTORY OF MALIGNANT NEOPLASM OF TRACHEA, BRONCHUS AND LUNG: ICD-10-CM

## 2018-01-04 DIAGNOSIS — Z82.49 FAMILY HISTORY OF ISCHEMIC HEART DISEASE AND OTHER DISEASES OF THE CIRCULATORY SYSTEM: ICD-10-CM

## 2018-01-04 PROCEDURE — 99024 POSTOP FOLLOW-UP VISIT: CPT

## 2018-01-05 PROBLEM — Z82.49 FAMILY HISTORY OF HYPERTENSION: Status: ACTIVE | Noted: 2018-01-04

## 2018-01-05 PROBLEM — Z80.1 FAMILY HISTORY OF LUNG CANCER: Status: ACTIVE | Noted: 2018-01-04

## 2018-01-17 ENCOUNTER — APPOINTMENT (OUTPATIENT)
Dept: SURGERY | Facility: CLINIC | Age: 53
End: 2018-01-17
Payer: COMMERCIAL

## 2018-01-17 VITALS
HEART RATE: 112 BPM | WEIGHT: 159 LBS | SYSTOLIC BLOOD PRESSURE: 165 MMHG | DIASTOLIC BLOOD PRESSURE: 85 MMHG | BODY MASS INDEX: 30.02 KG/M2 | TEMPERATURE: 97.7 F | HEIGHT: 61 IN

## 2018-01-17 DIAGNOSIS — K81.0 ACUTE CHOLECYSTITIS: ICD-10-CM

## 2018-01-17 PROCEDURE — 99024 POSTOP FOLLOW-UP VISIT: CPT

## 2018-01-17 RX ORDER — METRONIDAZOLE 250 MG/1
250 TABLET ORAL
Refills: 0 | Status: DISCONTINUED | COMMUNITY
End: 2018-01-17

## 2018-01-17 RX ORDER — CEFUROXIME AXETIL 250 MG/1
250 TABLET ORAL
Refills: 0 | Status: DISCONTINUED | COMMUNITY
End: 2018-01-17

## 2019-02-28 NOTE — ED PROVIDER NOTE - ABDOMINAL TENDER
right upper quadrant
Airway patent, Nasal mucosa clear. Mouth with normal mucosa. Throat has no vesicles, no oropharyngeal exudates and uvula is midline.

## 2019-10-07 NOTE — H&P ADULT - FAMILY HISTORY
No pertinent family history in first degree relatives [Wheezing] : wheezing [Blurry Vision] : blurred vision [Negative] : Musculoskeletal [Fever] : no fever [Headache] : no headache [Chills] : no chills [Feeling Fatigued] : not feeling fatigued [Seeing Double (Diplopia)] : no diplopia [Joint Pain] : no joint pain [Skin: A Rash] : no rash: [Dizziness] : no dizziness [Depression] : no depression [Anxiety] : no anxiety [Excessive Thirst] : no polydipsia [Easy Bleeding] : no tendency for easy bleeding [Easy Bruising] : no tendency for easy bruising

## 2020-09-26 NOTE — PRE-OP CHECKLIST - PATIENT SENT AT
[phone]  Receiving Agency/Facility address:  [facility]  Receiving Agency/Facility city/state: [place] [address]  Receiving Agency/Facility Type: Skilled Nursing Facility     Angie MO, BAKARIW  [id number]   27-Dec-2017 13:54

## 2021-11-12 NOTE — ED ADULT NURSE NOTE - GENITOURINARY ASSESSMENT
PVC's 312.9 per hour recorded from June 28, 2021 to Nov 12, 2021  Patient is not pacer dependent
WDL

## 2022-02-15 NOTE — ED PROVIDER NOTE - NS ED MD DISPO ADMITTING SERVICE
---------------------  From: Bree Rico LPN (Phone Messages Pool ([id number] [facility] [place]))   To: Zaya Message Pool ([id number]_[facility]);     Sent: 12/1/2021 8:04:43 AM CST  Subject: CONSUMER MESSAGE FW: Problem with med     Pt had appt [facility] on 11/24        ---------------------  From: MARILYN BURCH  To: [facility]  Sent: 11/30/2021 09:49 p.m. CST  Subject: Problem with med  Hello, this is Marilyn s Granddaughter Alethea. The last time we spoke to Dr Celestin on November 24th. He add Mentizapine at bed time a low does of 15mg,  to help her with her depression and to help stimulate her appetite. the problem is that she is having bad nightmares, can we change her medicine? Please call me at [phone] to let me know what we can do next.    Thanks,    Alethea---------------------  From: Mehreen/Sushma FREGOSO ([employer] Message Pool ([id number]_[facility]))   To: Ralph BETTS [facility];     Sent: 12/1/2021 8:15:48 AM CST  Subject: FW: CONSUMER MESSAGE FW: Problem with medCalled at 530pm, no answer. LM that I would try again tomorrow.Grand-daughter called at 1733. Message sent to Zaya.Spoke with granddaughter at 255pm. She is with patient in [place]. Patient still having nightmares. Will stop mirtazepine. Has previously failed fluoxetine, bupropion, sertraline.     Try escitalopram 5mg as ordered. Follow up in 3-4 weeks.   FSURG

## 2022-10-27 NOTE — PATIENT PROFILE ADULT. - PAIN CHRONIC, PROFILE
0730 Bedside shift change report given to Fabby Caballero (oncoming nurse) by Meghann Izquierdo (offgoing nurse). Report included the following information SBAR, Kardex, ED Summary, Procedure Summary, Intake/Output, MAR, and Recent Results. 2000 Bedside shift change report given to [facility] (oncoming nurse) by Fabby Caballero (offgoing nurse). Report included the following information SBAR, Kardex, ED Summary, Intake/Output, MAR, and Recent Results. no